# Patient Record
Sex: MALE | Race: ASIAN | NOT HISPANIC OR LATINO | URBAN - METROPOLITAN AREA
[De-identification: names, ages, dates, MRNs, and addresses within clinical notes are randomized per-mention and may not be internally consistent; named-entity substitution may affect disease eponyms.]

---

## 2019-11-13 ENCOUNTER — APPOINTMENT (RX ONLY)
Dept: URBAN - METROPOLITAN AREA OTHER 9 | Facility: OTHER | Age: 74
Setting detail: DERMATOLOGY
End: 2019-11-13

## 2019-11-13 DIAGNOSIS — L11.1 TRANSIENT ACANTHOLYTIC DERMATOSIS [GROVER]: ICD-10-CM

## 2019-11-13 DIAGNOSIS — L30.0 NUMMULAR DERMATITIS: ICD-10-CM

## 2019-11-13 PROBLEM — I10 ESSENTIAL (PRIMARY) HYPERTENSION: Status: ACTIVE | Noted: 2019-11-13

## 2019-11-13 PROCEDURE — 99202 OFFICE O/P NEW SF 15 MIN: CPT

## 2019-11-13 PROCEDURE — ? TREATMENT REGIMEN

## 2019-11-13 PROCEDURE — ? PRESCRIPTION

## 2019-11-13 PROCEDURE — ? COUNSELING

## 2019-11-13 RX ORDER — CLOBETASOL PROPIONATE 0.5 MG/G
CREAM TOPICAL BID
Qty: 1 | Refills: 0 | COMMUNITY
Start: 2019-11-13

## 2019-11-13 RX ADMIN — CLOBETASOL PROPIONATE: 0.5 CREAM TOPICAL at 17:34

## 2019-11-13 ASSESSMENT — LOCATION DETAILED DESCRIPTION DERM
LOCATION DETAILED: PERIUMBILICAL SKIN
LOCATION DETAILED: RIGHT ANTERIOR DISTAL THIGH
LOCATION DETAILED: LEFT ANTERIOR DISTAL THIGH

## 2019-11-13 ASSESSMENT — LOCATION ZONE DERM
LOCATION ZONE: LEG
LOCATION ZONE: TRUNK

## 2019-11-13 ASSESSMENT — LOCATION SIMPLE DESCRIPTION DERM
LOCATION SIMPLE: ABDOMEN
LOCATION SIMPLE: RIGHT THIGH
LOCATION SIMPLE: LEFT THIGH

## 2024-01-04 ENCOUNTER — LAB OUTSIDE AN ENCOUNTER (OUTPATIENT)
Dept: URBAN - METROPOLITAN AREA CLINIC 98 | Facility: CLINIC | Age: 79
End: 2024-01-04

## 2024-01-04 ENCOUNTER — OFFICE VISIT (OUTPATIENT)
Dept: URBAN - METROPOLITAN AREA CLINIC 98 | Facility: CLINIC | Age: 79
End: 2024-01-04
Payer: SELF-PAY

## 2024-01-04 VITALS
WEIGHT: 165.4 LBS | BODY MASS INDEX: 23.68 KG/M2 | HEIGHT: 70 IN | SYSTOLIC BLOOD PRESSURE: 127 MMHG | DIASTOLIC BLOOD PRESSURE: 75 MMHG | TEMPERATURE: 97.3 F | HEART RATE: 58 BPM

## 2024-01-04 DIAGNOSIS — R13.19 ESOPHAGEAL DYSPHAGIA: ICD-10-CM

## 2024-01-04 DIAGNOSIS — R12 HEARTBURN: ICD-10-CM

## 2024-01-04 DIAGNOSIS — R11.10 ACUTE VOMITING: ICD-10-CM

## 2024-01-04 PROCEDURE — 99204 OFFICE O/P NEW MOD 45 MIN: CPT | Performed by: INTERNAL MEDICINE

## 2024-01-04 RX ORDER — ESOMEPRAZOLE SODIUM 40 MG/5ML
AS DIRECTED INJECTION INTRAVENOUS
Status: ACTIVE | COMMUNITY

## 2024-01-04 RX ORDER — ATENOLOL 50 MG/1
1 TABLET TABLET ORAL ONCE A DAY
Status: ACTIVE | COMMUNITY

## 2024-01-04 RX ORDER — ESOMEPRAZOLE MAGNESIUM 40 MG/1
1 CAPSULE CAPSULE, DELAYED RELEASE ORAL ONCE A DAY
Qty: 30 | OUTPATIENT
Start: 2024-01-04

## 2024-01-04 NOTE — HPI-TODAY'S VISIT:
Mr. Palacio is a 77 yo M presenting for new patient visit for trouble swallowing. Visiting his son from [place].  Takes esomeprazole 40 mg daily for several years for history of GERD. No regular reflux recently.  2 days ago he was eating lunch- chicken salad and bread. He felt the food get stuck in his chest and then he vomited the food back up. He had no trouble swallowing before. He tried to eat again and then the food got stuck in his throat and he vomited again.  Has not eaten solid food since then. Able to swallow liquids with no problem and pills with no problem. No chest or throat pain. Handling secretions well. Feels normal today- he is just scared to eat.  This has not happened before. No recent weight loss.  Last EGD over 10 years ago for heartburn.  He had some ear issues and had laryngoscope which was normal, per patient.    11/23/23 labs:  Hgb 10.9 MCV 84.6
Yes

## 2024-01-05 ENCOUNTER — LAB OUTSIDE AN ENCOUNTER (OUTPATIENT)
Dept: URBAN - METROPOLITAN AREA SURGERY CENTER 18 | Facility: SURGERY CENTER | Age: 79
End: 2024-01-05

## 2024-01-05 ENCOUNTER — OFFICE VISIT (OUTPATIENT)
Dept: URBAN - METROPOLITAN AREA MEDICAL CENTER 39 | Facility: MEDICAL CENTER | Age: 79
End: 2024-01-05
Payer: SELF-PAY

## 2024-01-05 ENCOUNTER — OUT OF OFFICE VISIT (OUTPATIENT)
Dept: URBAN - METROPOLITAN AREA SURGERY CENTER 18 | Facility: SURGERY CENTER | Age: 79
End: 2024-01-05
Payer: SELF-PAY

## 2024-01-05 DIAGNOSIS — R13.19 ESOPHAGEAL DYSPHAGIA: ICD-10-CM

## 2024-01-05 DIAGNOSIS — T18.128A FOOD IMPACTION OF ESOPHAGUS: ICD-10-CM

## 2024-01-05 DIAGNOSIS — R13.10 DYSPHAGIA: ICD-10-CM

## 2024-01-05 DIAGNOSIS — K22.10 BARRETT'S ESOPHAGEAL ULCERATION: ICD-10-CM

## 2024-01-05 DIAGNOSIS — K29.60 ADENOPAPILLOMATOSIS GASTRICA: ICD-10-CM

## 2024-01-05 DIAGNOSIS — K31.89 RETAINED FOOD IN STOMACH: ICD-10-CM

## 2024-01-05 PROCEDURE — G8909 PT DOC NO BURN PRIOR TO D/C: HCPCS | Performed by: INTERNAL MEDICINE

## 2024-01-05 PROCEDURE — G8914 PT TRANS TO HOSP POST D/C: HCPCS | Performed by: INTERNAL MEDICINE

## 2024-01-05 PROCEDURE — 00731 ANES UPR GI NDSC PX NOS: CPT | Performed by: NURSE ANESTHETIST, CERTIFIED REGISTERED

## 2024-01-05 PROCEDURE — 43247 EGD REMOVE FOREIGN BODY: CPT | Performed by: INTERNAL MEDICINE

## 2024-01-05 PROCEDURE — 43235 EGD DIAGNOSTIC BRUSH WASH: CPT | Performed by: INTERNAL MEDICINE

## 2024-01-05 PROCEDURE — 43239 EGD BIOPSY SINGLE/MULTIPLE: CPT | Performed by: INTERNAL MEDICINE

## 2024-01-05 PROCEDURE — G8911 PT DOC NO FALL IN ASC: HCPCS | Performed by: INTERNAL MEDICINE

## 2024-01-05 RX ORDER — ESOMEPRAZOLE MAGNESIUM 40 MG/1
1 CAPSULE CAPSULE, DELAYED RELEASE ORAL ONCE A DAY
Qty: 30 | OUTPATIENT

## 2024-01-05 RX ORDER — ESOMEPRAZOLE SODIUM 40 MG/5ML
AS DIRECTED INJECTION INTRAVENOUS
Status: ACTIVE | COMMUNITY

## 2024-01-05 RX ORDER — ATENOLOL 50 MG/1
1 TABLET TABLET ORAL ONCE A DAY
Status: ACTIVE | COMMUNITY

## 2024-01-05 RX ORDER — ESOMEPRAZOLE MAGNESIUM 40 MG/1
1 CAPSULE CAPSULE, DELAYED RELEASE ORAL ONCE A DAY
Qty: 30 | Status: ACTIVE | COMMUNITY
Start: 2024-01-04

## 2024-01-18 ENCOUNTER — TELEPHONE ENCOUNTER (OUTPATIENT)
Dept: URBAN - METROPOLITAN AREA CLINIC 98 | Facility: CLINIC | Age: 79
End: 2024-01-18

## 2024-01-20 ENCOUNTER — OUT OF OFFICE VISIT (OUTPATIENT)
Dept: URBAN - METROPOLITAN AREA MEDICAL CENTER 39 | Facility: MEDICAL CENTER | Age: 79
End: 2024-01-20
Payer: SELF-PAY

## 2024-01-20 DIAGNOSIS — T18.128A FOOD IMPACTION OF ESOPHAGUS: ICD-10-CM

## 2024-01-20 DIAGNOSIS — K31.89 ACHYLIA: ICD-10-CM

## 2024-01-20 DIAGNOSIS — K22.2 ACQUIRED ESOPHAGEAL RING: ICD-10-CM

## 2024-01-20 DIAGNOSIS — K22.89 DILATATION OF ESOPHAGUS: ICD-10-CM

## 2024-01-20 PROCEDURE — 43247 EGD REMOVE FOREIGN BODY: CPT | Performed by: INTERNAL MEDICINE

## 2024-01-23 ENCOUNTER — P2P PATIENT RECORD (OUTPATIENT)
Age: 79
End: 2024-01-23

## 2024-01-24 ENCOUNTER — LAB OUTSIDE AN ENCOUNTER (OUTPATIENT)
Dept: URBAN - METROPOLITAN AREA CLINIC 98 | Facility: CLINIC | Age: 79
End: 2024-01-24

## 2024-01-24 ENCOUNTER — OFFICE VISIT (OUTPATIENT)
Dept: URBAN - METROPOLITAN AREA CLINIC 98 | Facility: CLINIC | Age: 79
End: 2024-01-24
Payer: SELF-PAY

## 2024-01-24 VITALS
BODY MASS INDEX: 23.28 KG/M2 | HEART RATE: 72 BPM | TEMPERATURE: 98.2 F | DIASTOLIC BLOOD PRESSURE: 65 MMHG | HEIGHT: 70 IN | WEIGHT: 162.6 LBS | SYSTOLIC BLOOD PRESSURE: 114 MMHG

## 2024-01-24 DIAGNOSIS — R13.19 ESOPHAGEAL DYSPHAGIA: ICD-10-CM

## 2024-01-24 DIAGNOSIS — K21.9 GERD WITHOUT ESOPHAGITIS: ICD-10-CM

## 2024-01-24 PROBLEM — 266435005: Status: ACTIVE | Noted: 2024-01-24

## 2024-01-24 PROCEDURE — 99214 OFFICE O/P EST MOD 30 MIN: CPT | Performed by: INTERNAL MEDICINE

## 2024-01-24 RX ORDER — ESOMEPRAZOLE SODIUM 40 MG/5ML
AS DIRECTED INJECTION INTRAVENOUS
Status: ACTIVE | COMMUNITY

## 2024-01-24 RX ORDER — ESOMEPRAZOLE MAGNESIUM 40 MG/1
1 CAPSULE CAPSULE, DELAYED RELEASE ORAL ONCE A DAY
Qty: 30 | Status: ACTIVE | COMMUNITY

## 2024-01-24 RX ORDER — PANTOPRAZOLE SODIUM 40 MG/1
1 TABLET TABLET, DELAYED RELEASE ORAL ONCE A DAY
Qty: 30 | Refills: 3 | OUTPATIENT
Start: 2024-01-27

## 2024-01-24 RX ORDER — ATENOLOL 50 MG/1
1 TABLET TABLET ORAL ONCE A DAY
Status: ACTIVE | COMMUNITY

## 2024-01-24 NOTE — HPI-TODAY'S VISIT:
79 yo pt, here w his wife and son, w chronic GERD who has presented to [facility] ER with  3 episodes of food impaction, most recent 2 weeks ago. EGD: distal esophageal stricture w ulcerative esophagitis, small hiatal hernia and mild diffuse gastritis. Since he has been on a bland diet, on Pantoprazole 40 mg po bid / Carafate suspension 1 gr po tid. Has been feeling well No abdominal pain and no p-prandial dyspepsia. No dysphagia / odynophagia. Normal bm's wo melenic stools and no hematochezia. No cardiorespiratory sxs, anorexia nor weight loss. No other complaints.

## 2024-01-27 ENCOUNTER — DASHBOARD ENCOUNTERS (OUTPATIENT)
Age: 79
End: 2024-01-27

## 2024-01-31 ENCOUNTER — WEB ENCOUNTER (OUTPATIENT)
Dept: URBAN - METROPOLITAN AREA CLINIC 98 | Facility: CLINIC | Age: 79
End: 2024-01-31

## 2024-01-31 RX ORDER — SUCRALFATE 1 G/10ML
10 ML 1 HOUR BEFORE MEALS AND AT BEDTIME ON AN EMPTY STOMACH SUSPENSION ORAL
Qty: 600 MILLILITER | Refills: 1 | OUTPATIENT
Start: 2024-02-02 | End: 2024-03-01

## 2024-01-31 RX ORDER — PANTOPRAZOLE SODIUM 40 MG/1
1 TABLET TABLET, DELAYED RELEASE ORAL TWICE A DAY
Qty: 60 TABLET | Refills: 3 | OUTPATIENT
Start: 2024-02-02

## 2024-02-23 ENCOUNTER — EGD W/ DIL (OUTPATIENT)
Dept: URBAN - METROPOLITAN AREA SURGERY CENTER 18 | Facility: SURGERY CENTER | Age: 79
End: 2024-02-23